# Patient Record
Sex: FEMALE
[De-identification: names, ages, dates, MRNs, and addresses within clinical notes are randomized per-mention and may not be internally consistent; named-entity substitution may affect disease eponyms.]

---

## 2020-01-13 ENCOUNTER — APPOINTMENT (OUTPATIENT)
Dept: ANTEPARTUM | Facility: CLINIC | Age: 35
End: 2020-01-13

## 2020-06-29 ENCOUNTER — INPATIENT (INPATIENT)
Facility: HOSPITAL | Age: 35
LOS: 2 days | Discharge: HOME | End: 2020-07-02
Attending: OBSTETRICS & GYNECOLOGY | Admitting: OBSTETRICS & GYNECOLOGY
Payer: COMMERCIAL

## 2020-06-29 ENCOUNTER — RESULT REVIEW (OUTPATIENT)
Age: 35
End: 2020-06-29

## 2020-06-29 VITALS — HEIGHT: 60 IN | WEIGHT: 141.1 LBS

## 2020-06-29 DIAGNOSIS — Z3A.00 WEEKS OF GESTATION OF PREGNANCY NOT SPECIFIED: ICD-10-CM

## 2020-06-29 DIAGNOSIS — O26.899 OTHER SPECIFIED PREGNANCY RELATED CONDITIONS, UNSPECIFIED TRIMESTER: ICD-10-CM

## 2020-06-29 LAB
BASOPHILS # BLD AUTO: 0.01 K/UL — SIGNIFICANT CHANGE UP (ref 0–0.2)
BASOPHILS NFR BLD AUTO: 0.1 % — SIGNIFICANT CHANGE UP (ref 0–2)
BLD GP AB SCN SERPL QL: NEGATIVE — SIGNIFICANT CHANGE UP
EOSINOPHIL # BLD AUTO: 0.05 K/UL — SIGNIFICANT CHANGE UP (ref 0–0.5)
EOSINOPHIL NFR BLD AUTO: 0.6 % — SIGNIFICANT CHANGE UP (ref 0–6)
HCT VFR BLD CALC: 38.8 % — SIGNIFICANT CHANGE UP (ref 34.5–45)
HGB BLD-MCNC: 13.2 G/DL — SIGNIFICANT CHANGE UP (ref 11.5–15.5)
IMM GRANULOCYTES NFR BLD AUTO: 0.9 % — SIGNIFICANT CHANGE UP (ref 0–1.5)
LYMPHOCYTES # BLD AUTO: 2.26 K/UL — SIGNIFICANT CHANGE UP (ref 1–3.3)
LYMPHOCYTES # BLD AUTO: 25.6 % — SIGNIFICANT CHANGE UP (ref 13–44)
MCHC RBC-ENTMCNC: 30.7 PG — SIGNIFICANT CHANGE UP (ref 27–34)
MCHC RBC-ENTMCNC: 34 GM/DL — SIGNIFICANT CHANGE UP (ref 32–36)
MCV RBC AUTO: 90.2 FL — SIGNIFICANT CHANGE UP (ref 80–100)
MONOCYTES # BLD AUTO: 0.64 K/UL — SIGNIFICANT CHANGE UP (ref 0–0.9)
MONOCYTES NFR BLD AUTO: 7.2 % — SIGNIFICANT CHANGE UP (ref 2–14)
NEUTROPHILS # BLD AUTO: 5.8 K/UL — SIGNIFICANT CHANGE UP (ref 1.8–7.4)
NEUTROPHILS NFR BLD AUTO: 65.6 % — SIGNIFICANT CHANGE UP (ref 43–77)
NRBC # BLD: 0 /100 WBCS — SIGNIFICANT CHANGE UP (ref 0–0)
PLATELET # BLD AUTO: 201 K/UL — SIGNIFICANT CHANGE UP (ref 150–400)
RBC # BLD: 4.3 M/UL — SIGNIFICANT CHANGE UP (ref 3.8–5.2)
RBC # FLD: 13.8 % — SIGNIFICANT CHANGE UP (ref 10.3–14.5)
RH IG SCN BLD-IMP: POSITIVE — SIGNIFICANT CHANGE UP
SARS-COV-2 RNA SPEC QL NAA+PROBE: SIGNIFICANT CHANGE UP
WBC # BLD: 8.84 K/UL — SIGNIFICANT CHANGE UP (ref 3.8–10.5)
WBC # FLD AUTO: 8.84 K/UL — SIGNIFICANT CHANGE UP (ref 3.8–10.5)

## 2020-06-29 RX ORDER — SODIUM CHLORIDE 9 MG/ML
1000 INJECTION, SOLUTION INTRAVENOUS
Refills: 0 | Status: DISCONTINUED | OUTPATIENT
Start: 2020-06-29 | End: 2020-06-30

## 2020-06-29 RX ORDER — OXYTOCIN 10 UNIT/ML
1 VIAL (ML) INJECTION
Qty: 30 | Refills: 0 | Status: DISCONTINUED | OUTPATIENT
Start: 2020-06-29 | End: 2020-06-30

## 2020-06-29 RX ORDER — FENTANYL/BUPIVACAINE/NS/PF 2MCG/ML-.1
250 PLASTIC BAG, INJECTION (ML) INJECTION
Refills: 0 | Status: DISCONTINUED | OUTPATIENT
Start: 2020-06-29 | End: 2020-06-30

## 2020-06-29 RX ORDER — OXYTOCIN 10 UNIT/ML
333.33 VIAL (ML) INJECTION
Qty: 20 | Refills: 0 | Status: DISCONTINUED | OUTPATIENT
Start: 2020-06-29 | End: 2020-06-30

## 2020-06-29 RX ORDER — SODIUM CHLORIDE 9 MG/ML
500 INJECTION, SOLUTION INTRAVENOUS ONCE
Refills: 0 | Status: COMPLETED | OUTPATIENT
Start: 2020-06-29 | End: 2020-06-29

## 2020-06-29 RX ADMIN — SODIUM CHLORIDE 125 MILLILITER(S): 9 INJECTION, SOLUTION INTRAVENOUS at 17:36

## 2020-06-29 RX ADMIN — Medication 250 MILLILITER(S): at 20:20

## 2020-06-29 RX ADMIN — SODIUM CHLORIDE 500 MILLILITER(S): 9 INJECTION, SOLUTION INTRAVENOUS at 20:15

## 2020-06-29 NOTE — PATIENT PROFILE OB - FUNCTIONAL SCREEN CURRENT LEVEL: TRANSFERRING, MLM
Medical Week 2 Survey      Responses   [facility] patient discharged from?  [place]   COVID-19 Test Status  Not tested   Does the patient have one of the following disease processes/diagnoses(primary or secondary)?  Other   Week 2 attempt successful?  No   Unsuccessful attempts  Attempt 1          Kayleen Saunders LPN   0 = independent

## 2020-06-30 LAB
T PALLIDUM AB TITR SER: NEGATIVE — SIGNIFICANT CHANGE UP

## 2020-06-30 PROCEDURE — 88307 TISSUE EXAM BY PATHOLOGIST: CPT | Mod: 26

## 2020-06-30 RX ORDER — OXYCODONE HYDROCHLORIDE 5 MG/1
5 TABLET ORAL
Refills: 0 | Status: DISCONTINUED | OUTPATIENT
Start: 2020-06-30 | End: 2020-07-02

## 2020-06-30 RX ORDER — DIPHENHYDRAMINE HCL 50 MG
25 CAPSULE ORAL EVERY 6 HOURS
Refills: 0 | Status: DISCONTINUED | OUTPATIENT
Start: 2020-06-30 | End: 2020-07-02

## 2020-06-30 RX ORDER — BENZOCAINE 10 %
1 GEL (GRAM) MUCOUS MEMBRANE EVERY 6 HOURS
Refills: 0 | Status: DISCONTINUED | OUTPATIENT
Start: 2020-06-30 | End: 2020-07-02

## 2020-06-30 RX ORDER — MAGNESIUM HYDROXIDE 400 MG/1
30 TABLET, CHEWABLE ORAL
Refills: 0 | Status: DISCONTINUED | OUTPATIENT
Start: 2020-06-30 | End: 2020-07-02

## 2020-06-30 RX ORDER — IBUPROFEN 200 MG
600 TABLET ORAL EVERY 6 HOURS
Refills: 0 | Status: DISCONTINUED | OUTPATIENT
Start: 2020-06-30 | End: 2020-07-02

## 2020-06-30 RX ORDER — SODIUM CHLORIDE 9 MG/ML
3 INJECTION INTRAMUSCULAR; INTRAVENOUS; SUBCUTANEOUS EVERY 8 HOURS
Refills: 0 | Status: DISCONTINUED | OUTPATIENT
Start: 2020-06-30 | End: 2020-07-02

## 2020-06-30 RX ORDER — KETOROLAC TROMETHAMINE 30 MG/ML
30 SYRINGE (ML) INJECTION ONCE
Refills: 0 | Status: DISCONTINUED | OUTPATIENT
Start: 2020-06-30 | End: 2020-06-30

## 2020-06-30 RX ORDER — OXYCODONE HYDROCHLORIDE 5 MG/1
5 TABLET ORAL ONCE
Refills: 0 | Status: DISCONTINUED | OUTPATIENT
Start: 2020-06-30 | End: 2020-07-02

## 2020-06-30 RX ORDER — SIMETHICONE 80 MG/1
80 TABLET, CHEWABLE ORAL EVERY 4 HOURS
Refills: 0 | Status: DISCONTINUED | OUTPATIENT
Start: 2020-06-30 | End: 2020-07-02

## 2020-06-30 RX ORDER — PRAMOXINE HYDROCHLORIDE 150 MG/15G
1 AEROSOL, FOAM RECTAL EVERY 4 HOURS
Refills: 0 | Status: DISCONTINUED | OUTPATIENT
Start: 2020-06-30 | End: 2020-07-02

## 2020-06-30 RX ORDER — HYDROCORTISONE 1 %
1 OINTMENT (GRAM) TOPICAL EVERY 6 HOURS
Refills: 0 | Status: DISCONTINUED | OUTPATIENT
Start: 2020-06-30 | End: 2020-07-02

## 2020-06-30 RX ORDER — ACETAMINOPHEN 500 MG
975 TABLET ORAL
Refills: 0 | Status: DISCONTINUED | OUTPATIENT
Start: 2020-06-30 | End: 2020-07-02

## 2020-06-30 RX ORDER — LANOLIN
1 OINTMENT (GRAM) TOPICAL EVERY 6 HOURS
Refills: 0 | Status: DISCONTINUED | OUTPATIENT
Start: 2020-06-30 | End: 2020-07-02

## 2020-06-30 RX ORDER — OXYTOCIN 10 UNIT/ML
333.33 VIAL (ML) INJECTION
Qty: 20 | Refills: 0 | Status: DISCONTINUED | OUTPATIENT
Start: 2020-06-30 | End: 2020-07-02

## 2020-06-30 RX ORDER — TETANUS TOXOID, REDUCED DIPHTHERIA TOXOID AND ACELLULAR PERTUSSIS VACCINE, ADSORBED 5; 2.5; 8; 8; 2.5 [IU]/.5ML; [IU]/.5ML; UG/.5ML; UG/.5ML; UG/.5ML
0.5 SUSPENSION INTRAMUSCULAR ONCE
Refills: 0 | Status: DISCONTINUED | OUTPATIENT
Start: 2020-06-30 | End: 2020-07-02

## 2020-06-30 RX ORDER — DIBUCAINE 1 %
1 OINTMENT (GRAM) RECTAL EVERY 6 HOURS
Refills: 0 | Status: DISCONTINUED | OUTPATIENT
Start: 2020-06-30 | End: 2020-07-02

## 2020-06-30 RX ORDER — IBUPROFEN 200 MG
600 TABLET ORAL EVERY 6 HOURS
Refills: 0 | Status: COMPLETED | OUTPATIENT
Start: 2020-06-30 | End: 2021-05-29

## 2020-06-30 RX ORDER — AER TRAVELER 0.5 G/1
1 SOLUTION RECTAL; TOPICAL EVERY 4 HOURS
Refills: 0 | Status: DISCONTINUED | OUTPATIENT
Start: 2020-06-30 | End: 2020-07-02

## 2020-06-30 RX ADMIN — Medication 30 MILLIGRAM(S): at 05:09

## 2020-06-30 RX ADMIN — SODIUM CHLORIDE 3 MILLILITER(S): 9 INJECTION INTRAMUSCULAR; INTRAVENOUS; SUBCUTANEOUS at 14:46

## 2020-06-30 RX ADMIN — Medication 975 MILLIGRAM(S): at 20:20

## 2020-06-30 RX ADMIN — Medication 975 MILLIGRAM(S): at 08:18

## 2020-06-30 RX ADMIN — Medication 600 MILLIGRAM(S): at 17:08

## 2020-06-30 RX ADMIN — Medication 1 MILLIUNIT(S)/MIN: at 00:21

## 2020-06-30 RX ADMIN — Medication 1 TABLET(S): at 12:26

## 2020-07-01 ENCOUNTER — TRANSCRIPTION ENCOUNTER (OUTPATIENT)
Age: 35
End: 2020-07-01

## 2020-07-01 LAB
SURGICAL PATHOLOGY STUDY: SIGNIFICANT CHANGE UP

## 2020-07-01 RX ORDER — ACETAMINOPHEN 500 MG
3 TABLET ORAL
Qty: 0 | Refills: 0 | DISCHARGE
Start: 2020-07-01

## 2020-07-01 RX ORDER — IBUPROFEN 200 MG
1 TABLET ORAL
Qty: 0 | Refills: 0 | DISCHARGE
Start: 2020-07-01

## 2020-07-01 RX ADMIN — Medication 975 MILLIGRAM(S): at 15:06

## 2020-07-01 RX ADMIN — SODIUM CHLORIDE 3 MILLILITER(S): 9 INJECTION INTRAMUSCULAR; INTRAVENOUS; SUBCUTANEOUS at 06:12

## 2020-07-01 RX ADMIN — Medication 975 MILLIGRAM(S): at 20:44

## 2020-07-01 RX ADMIN — Medication 975 MILLIGRAM(S): at 09:32

## 2020-07-01 RX ADMIN — Medication 1 TABLET(S): at 12:14

## 2020-07-01 RX ADMIN — Medication 975 MILLIGRAM(S): at 02:58

## 2020-07-01 RX ADMIN — Medication 600 MILLIGRAM(S): at 06:13

## 2020-07-01 RX ADMIN — Medication 600 MILLIGRAM(S): at 12:14

## 2020-07-01 RX ADMIN — Medication 600 MILLIGRAM(S): at 18:13

## 2020-07-01 NOTE — PROGRESS NOTE ADULT - ASSESSMENT
A/P: 35yyoF s/p uncomplicated , now PP1 with normal postpartum course. VSS.  1. Continue routine postpartum care  2. Pain: well controlled on oral pain medication  3. GI: Tolerating regular diet, continue bowel regimen  4. :  Voiding without difficulty  5. DVT prophylaxis: ambulating without assistance, no SCDs required at this time  6. Dispo: PP2 A/P: 35yyoF s/p uncomplicated , now PP1 with normal postpartum course. VSS.  1. Continue routine postpartum care  2. Pain: well controlled on oral pain medication  3. GI: Tolerating regular diet, continue bowel regimen  4. :  Voiding without difficulty  5. DVT prophylaxis: ambulation  6. Dispo: PP2

## 2020-07-01 NOTE — PROGRESS NOTE ADULT - SUBJECTIVE AND OBJECTIVE BOX
Pt seen and evaluated at bedside this morning. No overnight events. Pt is resting in bed comfortably and reports her pain is well controlled. Pt denies perineal discomfort, heavy vaginal bleeding or passing clots. Pt is now ambulating to the bathroom without assistance. +voiding +flatus -bm. Pt denies HA, dizziness, changes in vision, edema, fevers/chills, N/V/D/C, chest pain, or shortness of breath.    Physical Exam:  Vital Signs Last 24 Hrs  T(C): 36.6 (30 Jun 2020 23:49), Max: 36.9 (30 Jun 2020 09:25)  T(F): 97.9 (30 Jun 2020 23:49), Max: 98.4 (30 Jun 2020 09:25)  HR: 74 (30 Jun 2020 23:49) (64 - 74)  BP: 116/77 (30 Jun 2020 23:49) (104/69 - 120/78)  BP(mean): --  RR: 18 (30 Jun 2020 23:49) (16 - 18)  SpO2: 97% (30 Jun 2020 23:49) (97% - 99%)    Gen: NAD, A&0x3  Cardio: RRR  Pulm: no increased WOB, CTAB  Abd: +BS, soft, appropriately tended to palpation, nondistended, no rebound or guarding, uterus firm at midline under the umbilicus  : minimal lochia on pad, no hematoma noted  Extremities: no edema or calf tenderness to palpation                          13.2   8.84  )-----------( 201      ( 29 Jun 2020 17:38 )             38.8 Pt seen and evaluated at bedside this morning. No overnight events. Pt is resting in bed comfortably and reports her pain is well controlled. Pt report mild vaginal soreness but denies excessive perineal discomfort, heavy vaginal bleeding or passing clots. Pt is now ambulating to the bathroom without assistance. +voiding +flatus -bm. Pt denies HA, dizziness, changes in vision, edema, fevers/chills, N/V/D/C, chest pain, or shortness of breath.    Physical Exam:  Vital Signs Last 24 Hrs  T(C): 36.6 (30 Jun 2020 23:49), Max: 36.9 (30 Jun 2020 09:25)  T(F): 97.9 (30 Jun 2020 23:49), Max: 98.4 (30 Jun 2020 09:25)  HR: 74 (30 Jun 2020 23:49) (64 - 74)  BP: 116/77 (30 Jun 2020 23:49) (104/69 - 120/78)  BP(mean): --  RR: 18 (30 Jun 2020 23:49) (16 - 18)  SpO2: 97% (30 Jun 2020 23:49) (97% - 99%)    Gen: NAD, A&0x3  Cardio: RRR  Pulm: no increased WOB, CTAB  Abd: +BS, soft, appropriately tended to palpation, nondistended, no rebound or guarding, uterus firm at midline under the umbilicus  : minimal lochia on pad, no hematoma noted  Extremities: no edema or calf tenderness to palpation                          13.2   8.84  )-----------( 201      ( 29 Jun 2020 17:38 )             38.8

## 2020-07-01 NOTE — DISCHARGE NOTE OB - CARE PROVIDER_API CALL
Susy Martinez)  Obstetrics and Gynecology  [address], [address]  [place] [address]  Phone: [phone]  Fax: [phone]  Follow Up Time:

## 2020-07-01 NOTE — DISCHARGE NOTE OB - PATIENT PORTAL LINK FT
You can access the FollowMyHealth Patient Portal offered by [facility] by registering at the following website: [email or web address]. By joining [facility]’s FollowMyHealth portal, you will also be able to view your health information using other applications (apps) compatible with our system.

## 2020-07-01 NOTE — DISCHARGE NOTE OB - CARE PLAN
Principal Discharge DX:	Vaginal delivery  Secondary Diagnosis:	 delivery Principal Discharge DX:	Vaginal delivery  Goal:	Feel well!  Assessment and plan of treatment:	Vaginal delivery, meeting all postpartum milestones.  Follow up in 6 weeks.  Secondary Diagnosis:	 delivery

## 2020-07-02 VITALS
OXYGEN SATURATION: 99 % | SYSTOLIC BLOOD PRESSURE: 114 MMHG | TEMPERATURE: 98 F | RESPIRATION RATE: 16 BRPM | DIASTOLIC BLOOD PRESSURE: 74 MMHG | HEART RATE: 73 BPM

## 2020-07-02 PROCEDURE — 86850 RBC ANTIBODY SCREEN: CPT

## 2020-07-02 PROCEDURE — 88307 TISSUE EXAM BY PATHOLOGIST: CPT

## 2020-07-02 PROCEDURE — 85025 COMPLETE CBC W/AUTO DIFF WBC: CPT

## 2020-07-02 PROCEDURE — 36415 COLL VENOUS BLD VENIPUNCTURE: CPT

## 2020-07-02 PROCEDURE — 87635 SARS-COV-2 COVID-19 AMP PRB: CPT

## 2020-07-02 PROCEDURE — 86780 TREPONEMA PALLIDUM: CPT

## 2020-07-02 PROCEDURE — 86901 BLOOD TYPING SEROLOGIC RH(D): CPT

## 2020-07-02 PROCEDURE — 99214 OFFICE O/P EST MOD 30 MIN: CPT

## 2020-07-02 RX ADMIN — Medication 975 MILLIGRAM(S): at 09:08

## 2020-07-02 RX ADMIN — Medication 600 MILLIGRAM(S): at 12:31

## 2020-07-02 RX ADMIN — Medication 600 MILLIGRAM(S): at 05:58

## 2020-07-02 NOTE — PROGRESS NOTE ADULT - SUBJECTIVE AND OBJECTIVE BOX
Patient evaluated at bedside.      She reports pain is well controlled with medications.     She has been ambulating without assistance, voiding spontaneously, tolerating solid food and PO fluids, and vaginal bleeding is improved.     She denies HA, dizziness, chest pain, palpitations, shortness of breath, n/v, heavy vaginal bleeding or perineal discomfort.    Physical Exam:  Vital Signs Last 24 Hrs  T(C): 36.9 (01 Jul 2020 22:00), Max: 37.1 (01 Jul 2020 18:08)  T(F): 98.4 (01 Jul 2020 22:00), Max: 98.7 (01 Jul 2020 18:08)  HR: 74 (01 Jul 2020 22:00) (74 - 75)  BP: 107/76 (01 Jul 2020 22:00) (103/69 - 107/76)  RR: 18 (01 Jul 2020 22:00) (18 - 18)  SpO2: 98% (01 Jul 2020 22:00) (98% - 98%)    GA: NAD  Heart: RRR  Lungs: clear to auscultation bilaterally  Abd: + BS, soft, nontender, nondistended, no rebound or guarding, uterus firm at midline 2 fingerbreadths above the umbilicus  : lochia WNL  Extremities: no edema or calf tenderness

## 2020-07-02 NOTE — PROGRESS NOTE ADULT - ATTENDING COMMENTS
Pt seen and evaluated.  Doing well.   PPD 2 s/p   stable for d/c home.
patient was seen and examined at bedside.   agree w/ note above. PPD#1, doing well. con't routine postpartum care. d/c tomorrow.

## 2020-07-02 NOTE — PROGRESS NOTE ADULT - ASSESSMENT
A/P 35y  s/p , PP#2 , stable  1. Pain: OPM  2. GI: Reg  3. :  Voiding  4. DVT prophylaxis: SCDs, ambulation  5. Dispo: PP#2 unless otherwise specified

## 2020-07-14 DIAGNOSIS — O36.8130 DECREASED FETAL MOVEMENTS, THIRD TRIMESTER, NOT APPLICABLE OR UNSPECIFIED: ICD-10-CM

## 2020-07-14 DIAGNOSIS — O28.3 ABNORMAL ULTRASONIC FINDING ON ANTENATAL SCREENING OF MOTHER: ICD-10-CM

## 2020-07-14 DIAGNOSIS — Z79.899 OTHER LONG TERM (CURRENT) DRUG THERAPY: ICD-10-CM

## 2020-07-14 DIAGNOSIS — Z34.80 ENCOUNTER FOR SUPERVISION OF OTHER NORMAL PREGNANCY, UNSPECIFIED TRIMESTER: ICD-10-CM

## 2020-07-14 DIAGNOSIS — Z3A.36 36 WEEKS GESTATION OF PREGNANCY: ICD-10-CM

## 2023-11-02 RX ORDER — FERROUS SULFATE 325(65) MG
1 TABLET ORAL
Qty: 0 | Refills: 0 | DISCHARGE